# Patient Record
Sex: FEMALE | Race: WHITE
[De-identification: names, ages, dates, MRNs, and addresses within clinical notes are randomized per-mention and may not be internally consistent; named-entity substitution may affect disease eponyms.]

---

## 2019-12-10 ENCOUNTER — HOSPITAL ENCOUNTER (EMERGENCY)
Dept: HOSPITAL 25 - UCEAST | Age: 29
Discharge: HOME | End: 2019-12-10
Payer: COMMERCIAL

## 2019-12-10 VITALS — SYSTOLIC BLOOD PRESSURE: 105 MMHG | DIASTOLIC BLOOD PRESSURE: 71 MMHG

## 2019-12-10 DIAGNOSIS — R11.0: ICD-10-CM

## 2019-12-10 DIAGNOSIS — R53.83: Primary | ICD-10-CM

## 2019-12-10 DIAGNOSIS — R19.7: ICD-10-CM

## 2019-12-10 LAB
FLUAV RNA SPEC QL NAA+PROBE: NEGATIVE
FLUBV RNA SPEC QL NAA+PROBE: NEGATIVE

## 2019-12-10 PROCEDURE — 99212 OFFICE O/P EST SF 10 MIN: CPT

## 2019-12-10 PROCEDURE — 81003 URINALYSIS AUTO W/O SCOPE: CPT

## 2019-12-10 PROCEDURE — G0463 HOSPITAL OUTPT CLINIC VISIT: HCPCS

## 2019-12-10 PROCEDURE — 84702 CHORIONIC GONADOTROPIN TEST: CPT

## 2019-12-10 NOTE — UC
FLU HPI





- HPI Summary


HPI Summary: 





28 yo female presents with general illness. She tells me that on 12/8 she 

developed fatigue, body aches, diarrhea, and nausea. She is a  at 

[email or web address], but denies recent exposure to any infectious diseases such 

as crypto. She had about 8-10 loose stools on 12/8 and 12/9 and states she has 

not eaten anything since sunday night due to nausea. Today she has had 3 

episodes of loose stools, but state they are getting more firm. She still has 

not had anything to eat today, but has been drinking water. Overall she states 

she feels weak and dizzy. She is currently on her period. She denies fever, 

sinus symptoms, cough, sore throat, SOB, chest pain, abdominal pain, vomiting, 

dysuria, back pain, numbness. 





- History of Current Complaint


Chief Complaint: UCGeneralIllness


Stated Complaint: DIZZY, CHILLS


Time Seen by Provider: 12/10/19 10:41


Hx Obtained From: Patient


Hx Last Menstrual Period: 12/9/19


Onset/Duration: Sudden Onset


Severity Currently: Mild


Severity Initially: Mild


Pain Intensity: 3


Pain Scale Used: 0-10 Numeric





- Allergy/Home Medications


Allergies/Adverse Reactions: 


 Allergies











Allergy/AdvReac Type Severity Reaction Status Date / Time


 


No Known Allergies Allergy   Verified 12/10/19 10:08











Home Medications: 


 Home Medications





Ethinyl Estradiol/Drospirenone [Drospirenone-Ee 3-0.03 mg Tab] 1 tab PO DAILY 12

/10/19 [History Confirmed 12/10/19]











PMH/Surg Hx/FS Hx/Imm Hx





- Additional Past Medical History


Additional PMH: 





None





- Surgical History


Surgical History: Yes


Surgery Procedure, Year, and Place: wisdom teeth





- Family History


Known Family History: Positive: None





- Social History


Occupation: Employed Full-time


Lives: With Family


Alcohol Use: Rare


Substance Use Type: None


Smoking Status (MU): Never Smoked Tobacco





Review of Systems


All Other Systems Reviewed And Are Negative: No


Constitutional: Positive: Fatigue, Other - Body aches


Skin: Positive: Negative


Eyes: Positive: Negative


ENT: Positive: Negative


Respiratory: Positive: Negative


Cardiovascular: Positive: Negative


Gastrointestinal: Positive: Diarrhea, Nausea


Genitourinary: Positive: Negative


Motor: Positive: Negative


Neurovascular: Positive: Negative


Musculoskeletal: Positive: Negative


Neurological: Positive: Negative


Psychological: Positive: Negative





Physical Exam





- Summary


Physical Exam Summary: 





GENERAL: NAD. Tired appearing. 


SKIN: No rashes, sores, or open wounds.


HEENT:


            Head: AT/NC


            Eyes: PERRLA. EOM intact. Conjunctiva clear without inflammation or 

discharge.


            Ears: Hearing grossly normal. TMs intact, no bulging, erythema, or 

edema. 


            Nose: Nasal mucosa pink and moist. NTTP maxillary and frontal 

sinus. 


            Throat: Posterior oropharynx without exudates, erythema, or 

tonsillar enlargement.  Uvula midline.


NECK: Supple. Nontender. No lymphadenopathy. 


CHEST:  CTAB. No r/r/w. No accessory muscle use. Breathing comfortably and in 

no distress.


CV:  RRR. Pulses intact. Brisk cap refill.


ABDOMEN:  Soft. NTTP. No distention or guarding. No CVA tenderness. Bowel 

sounds present


MSK: FROM and 5/5 strength throughout. No edema.


NEURO: Alert. 


PSYCH: Age appropriate behavior.


Triage Information Reviewed: Yes


Vital Signs: 


 Initial Vital Signs











Temp  97.6 F   12/10/19 10:05


 


Pulse  83   12/10/19 10:05


 


Resp  16   12/10/19 10:05


 


BP  105/71   12/10/19 10:05


 


Pulse Ox  100   12/10/19 10:05








 Laboratory Tests











  12/10/19 12/10/19 12/10/19





  11:01 11:12 11:22


 


POC Urine Color   Yellow 


 


POC Urine Clarity   Clear 


 


POC Urine pH   7.0 


 


POC Ur Specif Gravity   1.010 


 


POC Urine Protein   1+ A 


 


POC Ur Glucose (UA)   Negative 


 


POC Urine Ketones   Negative 


 


POC Urine Blood   Negative 


 


POC Urine Nitrite   Negative 


 


POC Urine Bilirubin   Negative 


 


POC Urine Urobilinogen   0.2 


 


POC U Leukocyte Esteras   Negative 


 


POC Ur Pregnancy Test    Negative


 


Influenza A (Rapid)  Negative  


 


Influenza B (Rapid)  Negative  











Vital Signs Reviewed: Yes





Flu Course/Dx





- Course


Course Of Treatment: 





EKG NSR 68bpm. No STEMI as read by Dr. Jeffries.


POC flu and UA negative. Urine pregnancy negative.


Discussed with pt that her symptoms could be viral, but recommended going to 

the ER for labwork as she appears generally tired and mildly weak. She declined 

and did not want to do this. I offered to give her fluids here and draw labwork 

and stool cultures here and, initially, she was agreeable to this. As nursing 

was setting up for IV placement, pt changed her mind and declined IVF and 

labwork. She cites a fear of needles and IVs. I discussed with her that we can 

give her something for anxiety or po crackers/fluids to see if she improves. 

She continued to decline any further eval or treatment at this time and wishes 

to go home. I strongly encouraged her to return or go to the ED if her symptoms 

continue or worsen. She certainly may return to the  if she changes her mind. 

Pt voiced understanding





- Differential Dx/Diagnosis


Provider Diagnosis: 


 Fatigue, Diarrhea








Discharge ED





- Sign-Out/Discharge


Documenting (check all that apply): Patient Departure


All imaging exams completed and their final reports reviewed: No Studies





- Discharge Plan


Condition: Stable


Disposition: HOME


Referrals: 


No Primary Care Phys,NOPCP [Primary Care Provider] - 


Additional Instructions: 


If you develop a fever, shortness of breath, chest pain, new or worsening 

symptoms - please call your PCP or go to the ED immediately.


 


If you change your mind about receiving fluids or undergoing additional testing 

- please call or return to the Urgent Care or ER





- Billing Disposition and Condition


Condition: STABLE


Disposition: Home